# Patient Record
Sex: FEMALE | Race: WHITE
[De-identification: names, ages, dates, MRNs, and addresses within clinical notes are randomized per-mention and may not be internally consistent; named-entity substitution may affect disease eponyms.]

---

## 2020-03-08 ENCOUNTER — HOSPITAL ENCOUNTER (EMERGENCY)
Dept: HOSPITAL 4 - SED | Age: 25
Discharge: HOME | End: 2020-03-08
Payer: COMMERCIAL

## 2020-03-08 VITALS — SYSTOLIC BLOOD PRESSURE: 155 MMHG

## 2020-03-08 VITALS — HEIGHT: 61 IN | BODY MASS INDEX: 41.54 KG/M2 | WEIGHT: 220 LBS
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
## 2020-03-08 DIAGNOSIS — T31.0: ICD-10-CM

## 2020-03-08 DIAGNOSIS — T20.16XA: Primary | ICD-10-CM
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
## 2020-03-08 DIAGNOSIS — R03.0: ICD-10-CM
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
## 2020-03-08 NOTE — NUR
Patient given written and verbal discharge instructions and verbalizes 
understanding.  ER MD discussed with patient the results and treatment 
provided. Patient in stable condition. ID arm band removed. Rx of Keflex and 
Bacitracin given. Patient educated on pain management and to follow up with 
PMD. Pain Scale 0/10 Opportunity for questions provided and answered. 
Medication side effect fact sheet provided.